# Patient Record
Sex: FEMALE | Race: WHITE | NOT HISPANIC OR LATINO | Employment: PART TIME | URBAN - NONMETROPOLITAN AREA
[De-identification: names, ages, dates, MRNs, and addresses within clinical notes are randomized per-mention and may not be internally consistent; named-entity substitution may affect disease eponyms.]

---

## 2023-05-20 ENCOUNTER — APPOINTMENT (EMERGENCY)
Dept: RADIOLOGY | Facility: HOSPITAL | Age: 48
End: 2023-05-20

## 2023-05-20 ENCOUNTER — APPOINTMENT (OUTPATIENT)
Dept: URGENT CARE | Facility: MEDICAL CENTER | Age: 48
End: 2023-05-20

## 2023-05-20 ENCOUNTER — HOSPITAL ENCOUNTER (EMERGENCY)
Facility: HOSPITAL | Age: 48
Discharge: HOME | End: 2023-05-20
Attending: EMERGENCY MEDICINE

## 2023-05-20 VITALS
DIASTOLIC BLOOD PRESSURE: 96 MMHG | OXYGEN SATURATION: 100 % | SYSTOLIC BLOOD PRESSURE: 165 MMHG | TEMPERATURE: 97.5 F | RESPIRATION RATE: 16 BRPM | HEART RATE: 84 BPM

## 2023-05-20 DIAGNOSIS — S61.011A LACERATION OF RIGHT THUMB: Primary | ICD-10-CM

## 2023-05-21 ENCOUNTER — APPOINTMENT (OUTPATIENT)
Dept: URGENT CARE | Facility: MEDICAL CENTER | Age: 48
End: 2023-05-21

## 2023-05-26 ENCOUNTER — OCCMED (OUTPATIENT)
Dept: URGENT CARE | Facility: MEDICAL CENTER | Age: 48
End: 2023-05-26
Payer: OTHER MISCELLANEOUS
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
## 2023-05-26 PROCEDURE — 99212 OFFICE O/P EST SF 10 MIN: CPT

## 2023-05-26 NOTE — PROGRESS NOTES
[address]        NAME: Keven Christianson is a 46 y o  female  : 1975    MRN: [id number]  DATE: May 26, 2023  TIME: 6:10 PM    Assessment and Plan   No primary diagnosis found  No diagnosis found  Patient Instructions       Follow up with PCP in 3-5 days  Proceed to  ER if symptoms worsen  Chief Complaint   No chief complaint on file  History of Present Illness       HPI    Review of Systems   Review of Systems      Current Medications       Current Outpatient Medications:   •  calcium-vitamin D (OSCAL 500 + D) 500 mg-200 units per tablet, Take by mouth, Disp: , Rfl:   •  levothyroxine 175 mcg tablet, Take 1 tablet (175 mcg total) by mouth daily in the early morning, Disp: 90 tablet, Rfl: 0  •  venlafaxine (EFFEXOR) 37 5 mg tablet, TAKE ONE TABLET BY MOUTH TWICE A DAY, Disp: 60 tablet, Rfl: 0    Current Allergies     Allergies as of 2023   • (No Known Allergies)            The following portions of the patient's history were reviewed and updated as appropriate: allergies, current medications, past family history, past medical history, past social history, past surgical history and problem list      Past Medical History:   Diagnosis Date   • Cancer ([facility] [address] )     thyroid   • Depression     Resolved 2017    • Disease of thyroid gland    • Hypothyroid in pregnancy, antepartum     Resolved 2017    • Menses, irregular     Resolved 3/26/2015    • Premenstrual syndrome     Resolved 3/26/2015    • Previous  section     Resolved 2017        Past Surgical History:   Procedure Laterality Date   •  SECTION     • THYROID SURGERY     • TUBAL LIGATION         Family History   Problem Relation Age of Onset   • Hypertension Mother    • Diabetes type II Father    • Heart attack Family         Silent myocardial infarction          Medications have been verified  Objective   There were no vitals taken for this visit  No LMP recorded         Physical Exam Physical Exam

## 2023-06-02 ENCOUNTER — OFFICE VISIT (OUTPATIENT)
Dept: WOUND CARE | Facility: CLINIC | Age: 48
End: 2023-06-02

## 2023-06-02 VITALS
SYSTOLIC BLOOD PRESSURE: 145 MMHG | HEART RATE: 85 BPM | DIASTOLIC BLOOD PRESSURE: 84 MMHG | RESPIRATION RATE: 20 BRPM | TEMPERATURE: 99.1 F

## 2023-06-02 DIAGNOSIS — L92.9 HYPERGRANULATION: ICD-10-CM

## 2023-06-02 DIAGNOSIS — S61.001A OPEN WOUND OF RIGHT THUMB, INITIAL ENCOUNTER: Primary | ICD-10-CM

## 2023-06-02 RX ORDER — LIDOCAINE 40 MG/G
CREAM TOPICAL ONCE
Status: COMPLETED | OUTPATIENT
Start: 2023-06-02 | End: 2023-06-02

## 2023-06-02 RX ADMIN — LIDOCAINE: 40 CREAM TOPICAL at 14:35

## 2023-06-02 NOTE — PROGRESS NOTES
Patient ID: Keven Christianson is a 46 y o  female Date of Birth 1975       Chief Complaint   Patient presents with   • New Patient Visit       Allergies:  Patient has no known allergies  Diagnosis:   Diagnosis ICD-10-CM Associated Orders   1  Open wound of right thumb, initial encounter  S61 001A lidocaine (LMX) 4 % cream     Wound cleansing and dressings     mupirocin (BACTROBAN) 2 % ointment     Chemical Caut Of A Wound      2  Hypergranulation  L92 9 Chemical Caut Of A Wound           Assessment  & Plan:    o Initial evaluation of traumatic wound of the R thumb  Wound with fibrin and hypergranulation tissue  Drainage is minimal  There is mild erythema inferior to nail plate that is not adjacent to the wound and likely represents irritation following bandage removal    o Selective debridement performed  o Chemical cauterization of hypergranulation tissue performed    o Mupirocin to the wound bed  Change at least once daily but multiple times prn if dressing becomes dislodged or wet  o Do not soak wound in any body of water  Avoid activities such as dishwashing that require hands to be submerged in water    o Obtain 3-4 servings of protein daily for wound healing    o Monitor for increased surrounding redness, pain, or swelling of thumb  Call office should this occur    o Instructed to monitor for any changes including redness or swelling surrounding the wound, increased drainage or pain as well as fevers or chills  o Attempt smoking reduction/cessation to assist with wound healing    o F/u in one week  Instructed to call if any questions or concerns arise in meantime  Subjective:   06/02/23: Pt presents for f/u of traumatic wound of her R thumb  This occurred on 05/20/23 at work while using a  that cut her finger  Was referred to the ED and surgifoam was placed over the wound  XR was taken and did not demonstrate an acute osseous abnormality   She has been following up with urgent care prior to appointment and has been using Xeroform with daily dressing changes to the site of the wound  Denies numbness to area  Is limiting her ROM/use of thumb  Obtains protein in her diet  Does smoke a few cigarettes each day  No hx of DM  Denies fevers, chills           The following portions of the patient's history were reviewed and updated as appropriate:   Patient Active Problem List   Diagnosis   • Thyroid disorder   • Anxiety   • Malignant neoplasm of thyroid gland (HCC)   • Postoperative hypothyroidism   • H/O  section     Past Medical History:   Diagnosis Date   • Cancer ([facility] [address] )     thyroid   • Depression     Resolved 2017    • Disease of thyroid gland    • Hypothyroid in pregnancy, antepartum     Resolved 2017    • Menses, irregular     Resolved 3/26/2015    • Premenstrual syndrome     Resolved 3/26/2015    • Previous  section     Resolved 2017      Past Surgical History:   Procedure Laterality Date   •  SECTION     • THYROID SURGERY     • TUBAL LIGATION       Family History   Problem Relation Age of Onset   • Hypertension Mother    • Diabetes type II Father    • Heart attack Family         Silent myocardial infarction      Social History     Socioeconomic History   • Marital status: /Civil Union     Spouse name: Not on file   • Number of children: Not on file   • Years of education: Not on file   • Highest education level: Not on file   Occupational History   • Not on file   Tobacco Use   • Smoking status: Some Days     Types: Cigarettes   • Smokeless tobacco: Never   Substance and Sexual Activity   • Alcohol use: No     Comment: social   • Drug use: No   • Sexual activity: Yes     Partners: Male     Birth control/protection: Other   Other Topics Concern   • Not on file   Social History Narrative    Being a social drinker, resolved 3/26/2015 - As per [employer]    Never a smoker - As per [employer]    Never used drugs - As per [employer]    No alcohol use - As per [employer]    No living will      Social Determinants of Health     Financial Resource Strain: Not on file   Food Insecurity: Not on file   Transportation Needs: Not on file   Physical Activity: Not on file   Stress: Not on file   Social Connections: Not on file   Intimate Partner Violence: Not on file   Housing Stability: Not on file       Current Outpatient Medications:   •  mupirocin (BACTROBAN) 2 % ointment, Apply topically daily for 14 days To wound of R thumb , Disp: 22 g, Rfl: 0  •  calcium-vitamin D (OSCAL 500 + D) 500 mg-200 units per tablet, Take by mouth, Disp: , Rfl:   •  levothyroxine 175 mcg tablet, Take 1 tablet (175 mcg total) by mouth daily in the early morning, Disp: 90 tablet, Rfl: 0  •  venlafaxine (EFFEXOR) 37 5 mg tablet, TAKE ONE TABLET BY MOUTH TWICE A DAY, Disp: 60 tablet, Rfl: 0  No current facility-administered medications for this visit  Review of Systems   Constitutional: Negative for chills and fever  Skin: Positive for wound (R thumb)  Objective:  /84   Pulse 85   Temp 99 1 °F (37 3 °C)   Resp 20   Pain Score: 0-No pain     Physical Exam  Vitals reviewed  Constitutional:       General: She is not in acute distress  Appearance: She is normal weight  Cardiovascular:      Pulses:           Radial pulses are 2+ on the right side  Pulmonary:      Effort: Pulmonary effort is normal  No respiratory distress  Musculoskeletal:      Right hand: Decreased range of motion  Normal sensation  Hands:       Comments: R thumb wound with fibrin and hypergranulation tissue  Drainage is minimal  There is mild erythema inferior to nail plate that is not circumferential around the wound  Skin:     Findings: Wound (thumb right) present  Neurological:      Mental Status: She is alert             Wound 06/02/23 Traumatic Finger (Comment which one) Right (Active)   Wound Image   06/02/23 1432   Wound Description Hypergranulation;Pink 06/02/23 1432 "  Dominique-wound Assessment Clean;Dry;Black; Swelling 06/02/23 1432   Wound Length (cm) 1 1 cm 06/02/23 1432   Wound Width (cm) 0 7 cm 06/02/23 1432   Wound Depth (cm) 0 1 cm 06/02/23 1432   Wound Surface Area (cm^2) 0 77 cm^2 06/02/23 1432   Wound Volume (cm^3) 0 077 cm^3 06/02/23 1432   Calculated Wound Volume (cm^3) 0 08 cm^3 06/02/23 1432   Drainage Amount Small 06/02/23 1432   Drainage Description Bloody 06/02/23 1432   Non-staged Wound Description Full thickness 06/02/23 1432   Patient Tolerance Tolerated well 06/02/23 1432                                Chemical Caut Of A Wound     Date/Time 6/2/2023 2:30 PM     Performed by  Ariel Olmos PA-C   Authorized by Ariel Olmos PA-C      Associated wounds:   Wound 06/02/23 Traumatic Finger (Comment which one) Right   Universal Protocol   Consent: Verbal consent obtained  Consent given by: patient  Time out: Immediately prior to procedure a \"time out\" was called to verify the correct patient, procedure, equipment, support staff and site/side marked as required  Patient understanding: patient states understanding of the procedure being performed  Patient identity confirmed: verbally with patient        Local anesthesia used: yes     Anesthesia   Local anesthesia used: yes  Local Anesthetic: topical anesthetic     Sedation   Patient sedated: no        Specimen: no    Culture: no   Procedure Details   Procedure Notes: One silver nitrate stick used to treat hypergranulation tissue present  Neutralized with saline  Wound Instructions:  Orders Placed This Encounter   Procedures   • Wound cleansing and dressings     Remove old dressing, discard into plastic bag and place in trash  Wash hands and thumb wound with soap and water (Dove for sensitive skin)  Do not use tissue or cotton balls  Do not scrub the wound  Pat dry using gauze  Shower yes    Apply Mupirocin oint  to the right thumb wound  Cover with gauze, secure tape   Change dressing " every day and as needed for excessive drainage, leakage or displacement  This was done today  Follow up in one week       Standing Status:   Future     Standing Expiration Date:   6/2/2024   • Chemical Caut Of A Wound     This order was created via procedure documentation       Jasper Calero PA-C

## 2023-06-02 NOTE — PATIENT INSTRUCTIONS
Orders Placed This Encounter   Procedures    Wound cleansing and dressings     Remove old dressing, discard into plastic bag and place in trash  Wash hands and thumb wound with soap and water (Dove for sensitive skin)  Do not use tissue or cotton balls  Do not scrub the wound  Pat dry using gauze  Shower yes    Apply Mupirocin oint  to the right thumb wound  Cover with gauze, secure tape  Change dressing every day and as needed for excessive drainage, leakage or displacement  This was done today  Follow up in one week  Standing Status:   Future     Standing Expiration Date:   6/2/2024      Wound Healing and Your Diet   WHAT YOU NEED TO KNOW:   Your body uses nutrients from healthy foods to heal wounds caused by injury, surgery, or pressure injuries  There is no special diet that will heal your wound, but a healthy meal plan can help your wound heal faster  Nutrients that are important for healing are protein, zinc, and vitamin C  Liquids are also important for wound healing  DISCHARGE INSTRUCTIONS:   Follow a healthy meal plan:   Eat a variety of foods from each food group every day  Eat regular meals and snacks to help you get enough calories and nutrients  If you have trouble eating 3 meals each day, eat 5 to 6 small meals throughout the day instead  Include good sources of protein, zinc, and vitamin C each day  Drink liquids as directed  Drink plenty of liquids during and between meals, unless your healthcare provider has told you to limit liquids  Ask your healthcare provider or dietitian how much liquid to drink each day and which liquids are best for you  Limit unhealthy foods,  such as those that are high in fat, sugar, and salt  Examples include doughnuts, cookies, fried foods, candy, and regular soda  These kinds of foods are low in nutrients that are important for healing      Good sources of protein:  Your dietitian will tell you how much protein and how many calories you need each day  The average amount of protein in foods is listed below in grams (g)  To find the exact amount of protein in a food, read the food labels on packaged items  Dairy:      1 cup of any type of milk (8 g)    ½ cup of evaporated canned milk (9 g)    ¼ cup of nonfat dry milk (11 g)    1 ounce of semi-hard or solid cheese (7 g)    ¼ cup of parmesan cheese (8 g)    ½ cup of cottage cheese (14 g)    ½ cup of pudding (4 g)    1 cup of plain or fruit yogurt (8 g)    Meats and meat substitutes:      3 ounces of cooked freshwater fish (21 g)    3 ounces of cooked shellfish (19 g)    ½ cup of canned tuna (14 g)    3 ounces of cooked chicken, turkey, or other poultry (24 g)    3 ounces of cooked beef, pork, lamb, or other red meat (21 g)    1 large egg (6 g)    ¼ cup of fat-free egg substitute (5 g)    ½ cup of tofu or tempeh (10 g)    1 cup of cooked dried beans, such as peter, kidney, or navy (15 g)    Nuts and seeds:      2 tablespoons of almonds, cashews, sunflower seeds, or walnuts (5 g)    2 tablespoons of peanuts (7 g)    2 tablespoons of peanut butter (8 g)       How to add extra protein:   Add powdered milk to milk, cereals, scrambled eggs, soups, and casseroles  Add cheese to sauces, soups, or vegetables  Add eggs to tuna, salads, sauces, or casseroles  Add nutrition supplements and breakfast drink mixes to milk or shakes  Add nuts to foods or eat them as snacks  Add meat (beef, chicken, or pork) to soups, casseroles, pasta dishes, or vegetables  Add beans, peas, and other legumes to salads  Eat cottage cheese or yogurt with fruit  Good sources of vitamin C:  Vitamin C is found in fruits and vegetables  Fruits such as oranges, strawberries, grapefruit, cantaloupe, and tangerines are good sources of vitamin C  Red and green bell peppers, broccoli, potatoes, tomatoes, and cabbage are also high in vitamin C        Good sources of zinc:  Good sources of zinc are beef, liver, and crab   Smaller amounts of zinc are found in sunflower seeds, almonds, peanut butter, eggs, and milk  Other foods that contain zinc include wheat germ, black-eyed peas, and whole-grain products  How to add extra calories to foods: Your dietitian may recommend that you add extra calories to your meals if you are not eating enough  You can increase calories by adding butter, margarine, sugar, or jam to foods  Work with your dietitian if you have other medical conditions and need to follow a special diet  What else you should know about nutrients and wound healing: Your healthcare provider or dietitian may recommend vitamin and nutrition supplements if your body is low in certain nutrients  If your dietitian recommends a liquid nutrition supplement, take it between meals  Ask your healthcare provider which supplements are right for you  © Copyright Travon Xavier 2022 Information is for End User's use only and may not be sold, redistributed or otherwise used for commercial purposes  The above information is an  only  It is not intended as medical advice for individual conditions or treatments  Talk to your doctor, nurse or pharmacist before following any medical regimen to see if it is safe and effective for you  Cigarette Smoking and Your Health   WHAT YOU NEED TO KNOW:   What are the risks to my health if I smoke tobacco?  Nicotine and other chemicals found in tobacco and e-cigarettes can damage every cell in your body  Even if you are a light smoker, you have an increased risk for cancer, heart disease, and lung disease  If you are pregnant or have diabetes, smoking increases your risk for complications  Nicotine can affect an adolescent's developing brain  This can lead to trouble thinking, learning, or paying attention  What are the benefits to my health if I stop smoking? You decrease respiratory symptoms such as coughing, wheezing, and shortness of breath      You reduce your risk for cancers of the lung, mouth, throat, kidney, bladder, pancreas, stomach, and cervix  If you already have cancer, you increase the benefits of chemotherapy  You also reduce your risk for cancer returning or a second cancer from developing  You reduce your risk for heart disease, blood clots, heart attack, and stroke  You reduce your risk for lung infections, and diseases such as pneumonia, asthma, chronic bronchitis, and emphysema  Your circulation improves  More oxygen can be delivered to your body  If you have diabetes, you lower your risk for complications, such as kidney, artery, and eye diseases  You also lower your risk for nerve damage  Nerve damage can lead to amputations, poor vision, and blindness  You improve your body's ability to heal and to fight infections  An adolescent can help his or her brain and body develop in a healthy way  Talk to your adolescent about all the health risks of nicotine  If you can, start talking about nicotine when your child is younger than 12 years  This may make it easier for him or her not to start using nicotine as a teenager or adult  Explain to him or her that it is best never to start  It can be hard to try to quit later  What are the health benefits to others if I stop smoking? Tobacco is harmful to nonsmokers who breathe in your secondhand smoke  The following are ways the health of others around you may improve when you stop smoking: You lower the risks for lung cancer and heart disease in nonsmoking adults  If you are pregnant, you lower the risk for miscarriage, early delivery, low birth weight, and stillbirth  You also lower your baby's risk for SIDS, obesity, developmental delay, and neurobehavioral problems, such as ADHD  If you have children, you lower their risk for ear infections, colds, pneumonia, bronchitis, and asthma  Where can I find support and more information? American [employer] Association  [address] [facility]   [place] [address] [address]  Phone: 8- [phone] [facility]  Phone: [phone]  Web Address: Jimmy olson    Smokefree  gov  Phone: 2- 360 - 062-4232  Web Address: [employer] smokefree  gov  CARE AGREEMENT:   You have the right to help plan your care  Learn about your health condition and how it may be treated  Discuss treatment options with your healthcare providers to decide what care you want to receive  You always have the right to refuse treatment  The above information is an  only  It is not intended as medical advice for individual conditions or treatments  Talk to your doctor, nurse or pharmacist before following any medical regimen to see if it is safe and effective for you  © Copyright Tyshawn Smith 2022 Information is for End User's use only and may not be sold, redistributed or otherwise used for commercial purposes  Wound Infection   WHAT YOU NEED TO KNOW:   A wound infection occurs when bacteria enters a break in the skin  The infection may involve just the skin, or affect deeper tissues or organs close to the wound  DISCHARGE INSTRUCTIONS:   Seek care immediately if:   You feel short of breath  Your heart is beating faster than usual      You feel confused  Blood soaks through your bandages  Your wound comes apart or feels like it is ripping  You have severe pain  You see red streaks coming from the infected area  Contact your healthcare provider if:   You have a fever or chills  You have more pain, redness, or swelling near your wound  Your symptoms do not improve  The skin around your wound feels numb  You have questions or concerns about your condition or care  Medicines: You may need any of the following:  NSAIDs , such as ibuprofen, help decrease swelling, pain, and fever  This medicine is available with or without a doctor's order  NSAIDs can cause stomach bleeding or kidney problems in certain people   If you take blood thinner medicine, always ask your healthcare provider if NSAIDs are safe for you  Always read the medicine label and follow directions  Antibiotics  help treat a bacterial infection  Take your medicine as directed  Contact your healthcare provider if you think your medicine is not helping or if you have side effects  Tell your provider if you are allergic to any medicine  Keep a list of the medicines, vitamins, and herbs you take  Include the amounts, and when and why you take them  Bring the list or the pill bottles to follow-up visits  Carry your medicine list with you in case of an emergency  Care for your wound as directed:  Keep your wound clean and dry  You may need to cover your wound when you bathe so it does not get wet  Clean your wound as directed with soap and water or wound   Put on new, clean bandages as directed  Change your bandages when they get wet or dirty  Help your wound heal:   Eat a variety of healthy foods  Examples include fruits, vegetables, whole-grain breads, low-fat dairy products, beans, lean meats, and fish  Healthy foods may help you heal faster  You may also need to take vitamins and minerals  Ask if you need to be on a special diet  Manage other health conditions  Follow your provider's directions to manage health conditions that can cause slow wound healing  Examples include high blood pressure and diabetes  Do not smoke  Nicotine and other chemicals in cigarettes and cigars can cause slow wound healing  Ask your provider for information if you currently smoke and need help to quit  E-cigarettes or smokeless tobacco still contain nicotine  Talk to your provider before you use these products  Follow up with your healthcare provider in 1 to 2 days:  Write down your questions so you remember to ask them during your visits  © Copyright Vallie Flatten 2022 Information is for End User's use only and may not be sold, redistributed or otherwise used for commercial purposes  The above information is an  only   It is not intended as medical advice for individual conditions or treatments  Talk to your doctor, nurse or pharmacist before following any medical regimen to see if it is safe and effective for you

## 2023-06-02 NOTE — PROGRESS NOTES
"Debridement   Wound 06/02/23 Traumatic Finger (Comment which one) Right    Universal Protocol:  Consent: Verbal consent obtained  Consent given by: patient  Time out: Immediately prior to procedure a \"time out\" was called to verify the correct patient, procedure, equipment, support staff and site/side marked as required    Patient understanding: patient states understanding of the procedure being performed  Patient identity confirmed: verbally with patient      Performed by: PA  Debridement type: selective  Pain control: lidocaine 4%  Post-debridement measurements  Length (cm): 1 1  Width (cm): 0 7  Depth (cm): 0 1  Percent debrided: 100%  Surface Area (cm^2): 0 77  Area debrided (cm^2): 0 77  Volume (cm^3): 0 08  Devitalized tissue debrided: fibrin  Instrument(s) utilized: curette  Bleeding: small  Hemostasis obtained with: pressure  Procedural pain (0-10): 0  Post-procedural pain: 0   Response to treatment: procedure was tolerated well          "

## 2023-06-09 ENCOUNTER — OFFICE VISIT (OUTPATIENT)
Dept: WOUND CARE | Facility: CLINIC | Age: 48
End: 2023-06-09
Payer: OTHER MISCELLANEOUS

## 2023-06-09 VITALS
SYSTOLIC BLOOD PRESSURE: 138 MMHG | DIASTOLIC BLOOD PRESSURE: 80 MMHG | RESPIRATION RATE: 20 BRPM | HEART RATE: 74 BPM | TEMPERATURE: 99.4 F

## 2023-06-09 DIAGNOSIS — S61.001A OPEN WOUND OF RIGHT THUMB, INITIAL ENCOUNTER: Primary | ICD-10-CM

## 2023-06-09 PROCEDURE — NC001 PR NO CHARGE: Performed by: NURSE PRACTITIONER

## 2023-06-09 PROCEDURE — 97597 DBRDMT OPN WND 1ST 20 CM/<: CPT | Performed by: NURSE PRACTITIONER

## 2023-06-09 RX ORDER — LIDOCAINE 40 MG/G
CREAM TOPICAL ONCE
Status: COMPLETED | OUTPATIENT
Start: 2023-06-09 | End: 2023-06-09

## 2023-06-09 RX ADMIN — LIDOCAINE 1 APPLICATION.: 40 CREAM TOPICAL at 14:30

## 2023-06-09 NOTE — PATIENT INSTRUCTIONS
Orders Placed This Encounter   Procedures    Debridement     This order was created via procedure documentation    Wound cleansing and dressings     Remove old dressing, discard into plastic bag and place in trash  Wash hands and thumb wound with soap and water (Dove for sensitive skin)  Do not use tissue or cotton balls  Do not scrub the wound  Pat dry using gauze  Shower yes     Apply Mupirocin oint  to the right thumb wound  Cover with gauze, secure tape  Change dressing every day and as needed for excessive drainage, leakage or displacement  This was done today       Standing Status:   Future     Standing Expiration Date:   6/9/2024

## 2023-06-09 NOTE — PROGRESS NOTES
"Patient ID: Nikhil Kennedy is a 46 y o  female Date of Birth 1975     Chief Complaint  Chief Complaint   Patient presents with   • Follow Up Wound Care Visit       Allergies  Patient has no known allergies  Assessment:     Diagnoses and all orders for this visit:    Open wound of right thumb, initial encounter  -     lidocaine (LMX) 4 % cream  -     Wound cleansing and dressings; Future    Other orders  -     Debridement              Debridement   Wound 06/02/23 Traumatic Finger (Comment which one) Right    Universal Protocol:  Consent: Written consent obtained  Consent given by: patient  Time out: Immediately prior to procedure a \"time out\" was called to verify the correct patient, procedure, equipment, support staff and site/side marked as required  Timeout called at: 6/9/2023 2:42 PM   Patient identity confirmed: verbally with patient      Performed by: NP  Debridement type: selective  Pain control: lidocaine 4%  Pre-debridement measurements  Length (cm): 1  Width (cm): 0 3  Depth (cm): 0 1  Surface Area (cm^2): 0 3  Volume (cm^3): 0 03    Post-debridement measurements  Length (cm): 1  Width (cm): 0 3  Depth (cm): 0 1  Percent debrided: 100%  Surface Area (cm^2): 0 3  Area debrided (cm^2): 0 3  Volume (cm^3): 0 03  Devitalized tissue debrided: biofilm, fibrin and slough  Instrument(s) utilized: curette  Bleeding: small  Hemostasis obtained with: pressure  Procedural pain (0-10): 0  Post-procedural pain: 0   Response to treatment: procedure was tolerated well          Plan:  1  F/U visit  Wound debrided  Wound improving and measuring smaller  Continue current plan of care  Patient will follow-up in 1 week  Wound 06/02/23 Traumatic Finger (Comment which one) Right (Active)   Wound Image Images linked 06/09/23 1422   Wound Description Yellow;Pink;Pale 06/09/23 1429   Dominique-wound Assessment Dry; Intact 06/09/23 1429   Wound Length (cm) 1 cm 06/09/23 1429   Wound Width (cm) 0 3 cm 06/09/23 1429 " Wound Depth (cm) 0 1 cm 23   Wound Surface Area (cm^2) 0 3 cm^2 23 142   Wound Volume (cm^3) 0 03 cm^3 23 142   Calculated Wound Volume (cm^3) 0 03 cm^3 23 142   Change in Wound Size % 62 5 23 1429   Drainage Amount Small 23 1429   Drainage Description Serous 23 1429   Non-staged Wound Description Full thickness 23 142   Dressing Status Intact 23 1429       Wound 23 Traumatic Finger (Comment which one) Right (Active)   Date First Assessed/Time First Assessed: 23 1432   Present on Original Admission: Yes  Primary Wound Type: Traumatic  Location: Finger (Comment which one)  Wound Location Orientation: Right  Wound Description (Comments): Thumb       Subjective:        F/u visit for traumatic wound of right thumb  No new complaints  She denies any pain, fevers, or chills  The following portions of the patient's history were reviewed and updated as appropriate:   She  has a past medical history of Cancer ([facility] [address] ), Depression, Disease of thyroid gland, Hypothyroid in pregnancy, antepartum, Menses, irregular, Premenstrual syndrome, and Previous  section  She   Patient Active Problem List    Diagnosis Date Noted   • H/O  section 2022   • Thyroid disorder 2016   • Anxiety 2012   • Postoperative hypothyroidism 2011   • Malignant neoplasm of thyroid gland ([facility] [address] ) 2010     She  has a past surgical history that includes Thyroid surgery;  section; and Tubal ligation  Her family history includes Diabetes type II in her father; Heart attack in her family; Hypertension in her mother  She  reports that she has been smoking cigarettes  She has never used smokeless tobacco  She reports that she does not drink alcohol and does not use drugs    Current Outpatient Medications   Medication Sig Dispense Refill   • calcium-vitamin D (OSCAL 500 + D) 500 mg-200 units per tablet Take by mouth     • levothyroxine 175 mcg tablet Take 1 tablet (175 mcg total) by mouth daily in the early morning 90 tablet 0   • mupirocin (BACTROBAN) 2 % ointment Apply topically daily for 14 days To wound of R thumb  22 g 0   • venlafaxine (EFFEXOR) 37 5 mg tablet TAKE ONE TABLET BY MOUTH TWICE A DAY 60 tablet 0     No current facility-administered medications for this visit  She has No Known Allergies       Review of Systems   Constitutional: Negative  HENT: Negative for ear pain and hearing loss  Eyes: Negative for pain  Respiratory: Negative for chest tightness and shortness of breath  Cardiovascular: Negative for chest pain, palpitations and leg swelling  Gastrointestinal: Negative for diarrhea, nausea and vomiting  Genitourinary: Negative for dysuria  Musculoskeletal: Negative for gait problem  Skin: Positive for wound  Neurological: Negative for tremors and weakness  Psychiatric/Behavioral: Negative for behavioral problems, confusion and suicidal ideas  Objective:       Wound 06/02/23 Traumatic Finger (Comment which one) Right (Active)   Wound Image Images linked 06/09/23 1422   Wound Description Yellow;Pink;Pale 06/09/23 1429   Dominique-wound Assessment Dry; Intact 06/09/23 1429   Wound Length (cm) 1 cm 06/09/23 1429   Wound Width (cm) 0 3 cm 06/09/23 1429   Wound Depth (cm) 0 1 cm 06/09/23 1429   Wound Surface Area (cm^2) 0 3 cm^2 06/09/23 1429   Wound Volume (cm^3) 0 03 cm^3 06/09/23 1429   Calculated Wound Volume (cm^3) 0 03 cm^3 06/09/23 1429   Change in Wound Size % 62 5 06/09/23 1429   Drainage Amount Small 06/09/23 1429   Drainage Description Serous 06/09/23 1429   Non-staged Wound Description Full thickness 06/09/23 1429   Dressing Status Intact 06/09/23 1429       /80   Pulse 74   Temp 99 4 °F (37 4 °C)   Resp 20             Wound Instructions:  Orders Placed This Encounter   Procedures   • Debridement     This order was created via procedure documentation   • Wound cleansing and dressings     Remove old dressing, discard into plastic bag and place in trash  Wash hands and thumb wound with soap and water (Dove for sensitive skin)  Do not use tissue or cotton balls  Do not scrub the wound  Pat dry using gauze      Shower yes     Apply Mupirocin oint  to the right thumb wound  Cover with gauze, secure tape  Change dressing every day and as needed for excessive drainage, leakage or displacement  This was done today  Standing Status:   Future     Standing Expiration Date:   6/9/2024        Diagnosis ICD-10-CM Associated Orders   1   Open wound of right thumb, initial encounter  S61 001A lidocaine (LMX) 4 % cream     Wound cleansing and dressings

## 2023-06-16 ENCOUNTER — OFFICE VISIT (OUTPATIENT)
Dept: WOUND CARE | Facility: CLINIC | Age: 48
End: 2023-06-16
Payer: OTHER MISCELLANEOUS

## 2023-06-16 VITALS
DIASTOLIC BLOOD PRESSURE: 89 MMHG | SYSTOLIC BLOOD PRESSURE: 156 MMHG | RESPIRATION RATE: 20 BRPM | TEMPERATURE: 98.7 F | HEART RATE: 78 BPM

## 2023-06-16 DIAGNOSIS — S61.001A OPEN WOUND OF RIGHT THUMB, INITIAL ENCOUNTER: Primary | ICD-10-CM

## 2023-06-16 DIAGNOSIS — L85.9 HYPERKERATOSIS: ICD-10-CM

## 2023-06-16 PROCEDURE — 99212 OFFICE O/P EST SF 10 MIN: CPT | Performed by: STUDENT IN AN ORGANIZED HEALTH CARE EDUCATION/TRAINING PROGRAM

## 2023-06-16 RX ORDER — AMMONIUM LACTATE 12 G/100G
LOTION TOPICAL 2 TIMES DAILY PRN
Qty: 57 G | Refills: 0 | Status: SHIPPED | OUTPATIENT
Start: 2023-06-16 | End: 2023-07-16

## 2023-06-16 NOTE — PROGRESS NOTES
Patient ID: Kasia Armstrong is a 46 y o  female Date of Birth 1975       Chief Complaint   Patient presents with   • Follow Up Wound Care Visit       Allergies:  Patient has no known allergies  Diagnosis:   Diagnosis ICD-10-CM Associated Orders   1  Open wound of right thumb, initial encounter  S61 001A Wound cleansing and dressings      2  Hyperkeratosis  L85 9 ammonium lactate (LAC-HYDRIN) 12 % lotion           Assessment  & Plan:    • F/u R thumb wound  Is now healed  There is dry hyperkeratotic skin build-up present over the prior wound bed  No drainage from the site  No edema or erythema to indicate acute infection  There is deformity where tissue has been lost    o Lac hydrin to dry hyperkeratotic skin to help soften the area  If maceration occurs d/c use of the product    o If paresthesias continue would refer to hand specialist for further evaluation    o D/c from wound center  Call in future if any questions or concerns  Subjective:   06/02/23: Pt presents for f/u of traumatic wound of her R thumb  This occurred on 05/20/23 at work while using a  that cut her finger  Was referred to the ED and surgifoam was placed over the wound  XR was taken and did not demonstrate an acute osseous abnormality  She has been following up with urgent care prior to appointment and has been using Xeroform with daily dressing changes to the site of the wound  Denies numbness to area  Is limiting her ROM/use of thumb  Obtains protein in her diet  Does smoke a few cigarettes each day  No hx of DM  Denies fevers, chills  06/16/23: Pt presents for f/u of traumatic wound of her R thumb  The wound site has not continued to drain  There is hard skin over the site  She has continued placing the dressing over the area  Is noting a tingling sensation with pressure to the area but not pain           The following portions of the patient's history were reviewed and updated as appropriate:   Patient Active Problem List   Diagnosis   • Thyroid disorder   • Anxiety   • Malignant neoplasm of thyroid gland (HCC)   • Postoperative hypothyroidism   • H/O  section     Past Medical History:   Diagnosis Date   • Cancer ([facility] [address] )     thyroid   • Depression     Resolved 2017    • Disease of thyroid gland    • Hypothyroid in pregnancy, antepartum     Resolved 2017    • Menses, irregular     Resolved 3/26/2015    • Premenstrual syndrome     Resolved 3/26/2015    • Previous  section     Resolved 2017      Past Surgical History:   Procedure Laterality Date   •  SECTION     • THYROID SURGERY     • TUBAL LIGATION       Family History   Problem Relation Age of Onset   • Hypertension Mother    • Diabetes type II Father    • Heart attack Family         Silent myocardial infarction      Social History     Socioeconomic History   • Marital status: /Civil Union     Spouse name: Not on file   • Number of children: Not on file   • Years of education: Not on file   • Highest education level: Not on file   Occupational History   • Not on file   Tobacco Use   • Smoking status: Some Days     Types: Cigarettes   • Smokeless tobacco: Never   Substance and Sexual Activity   • Alcohol use: No     Comment: social   • Drug use: No   • Sexual activity: Yes     Partners: Male     Birth control/protection: Other   Other Topics Concern   • Not on file   Social History Narrative    Being a social drinker, resolved 3/26/2015 - As per [employer]    Never a smoker - As per [employer]    Never used drugs - As per [employer]    No alcohol use - As per [employer]    No living will      Social Determinants of Health     Financial Resource Strain: Not on file   Food Insecurity: Not on file   Transportation Needs: Not on file   Physical Activity: Not on file   Stress: Not on file   Social Connections: Not on file   Intimate Partner Violence: Not on file   Housing Stability: Not on file       Current Outpatient Medications: •  ammonium lactate (LAC-HYDRIN) 12 % lotion, Apply topically 2 (two) times a day as needed for dry skin Of R thumb, Disp: 57 g, Rfl: 0  •  calcium-vitamin D (OSCAL 500 + D) 500 mg-200 units per tablet, Take by mouth, Disp: , Rfl:   •  levothyroxine 175 mcg tablet, Take 1 tablet (175 mcg total) by mouth daily in the early morning, Disp: 90 tablet, Rfl: 0  •  mupirocin (BACTROBAN) 2 % ointment, Apply topically daily for 14 days To wound of R thumb , Disp: 22 g, Rfl: 0  •  venlafaxine (EFFEXOR) 37 5 mg tablet, TAKE ONE TABLET BY MOUTH TWICE A DAY, Disp: 60 tablet, Rfl: 0    Review of Systems   Constitutional: Negative for chills and fever  Skin: Positive for wound (R thumb-healed)  Objective:  /89   Pulse 78   Temp 98 7 °F (37 1 °C)   Resp 20   Pain Score: 0-No pain     Physical Exam  Vitals reviewed  Constitutional:       General: She is not in acute distress  Appearance: She is normal weight  Cardiovascular:      Pulses:           Radial pulses are 2+ on the right side  Pulmonary:      Effort: Pulmonary effort is normal  No respiratory distress  Musculoskeletal:      Right hand: Decreased range of motion  Normal sensation  Hands:       Comments:    R thumb wound is now healed  There is dry hyperkeratotic skin build-up present over the prior wound bed  No drainage from the site  No edema or erythema to indicate acute infection  There is deformity where tissue has been lost from initial trauma  Skin:     Findings: Wound (thumb right) present  Neurological:      Mental Status: She is alert  Wound 06/02/23 Traumatic Finger (Comment which one) Right (Active)   Wound Image   06/16/23 1500   Wound Description Epithelialization 06/16/23 1500   Dominique-wound Assessment Dry; Intact 06/16/23 1500   Wound Length (cm) 0 cm 06/16/23 1500   Wound Width (cm) 0 cm 06/16/23 1500   Wound Depth (cm) 0 cm 06/16/23 1500   Wound Surface Area (cm^2) 0 cm^2 06/16/23 1500   Wound Volume (cm^3) 0 cm^3 06/16/23 1500   Calculated Wound Volume (cm^3) 0 cm^3 06/16/23 1500   Change in Wound Size % 100 06/16/23 1500   Drainage Amount None 06/16/23 1500   Drainage Description Serous 06/09/23 1429   Non-staged Wound Description Full thickness 06/09/23 1429   Patient Tolerance Tolerated well 06/16/23 1500   Dressing Status Intact 06/09/23 1429                      Wound Instructions:  Orders Placed This Encounter   Procedures   • Wound cleansing and dressings     You are healed at this time  You do not need to return to the [place] cream to right thumb daily and as needed for dry skin      If you have any questions or concerns call [address] [facility],[address] at [phone]     Standing Status:   Future     Standing Expiration Date:   6/16/2024       Darcel Nissen, PA-C

## 2023-07-03 DIAGNOSIS — E03.9 HYPOTHYROIDISM, UNSPECIFIED TYPE: ICD-10-CM

## 2023-07-04 RX ORDER — LEVOTHYROXINE SODIUM 175 UG/1
175 TABLET ORAL
Qty: 90 TABLET | Refills: 0 | Status: SHIPPED | OUTPATIENT
Start: 2023-07-04

## 2023-09-29 DIAGNOSIS — E03.9 HYPOTHYROIDISM, UNSPECIFIED TYPE: ICD-10-CM

## 2023-10-02 RX ORDER — LEVOTHYROXINE SODIUM 175 UG/1
175 TABLET ORAL
Qty: 90 TABLET | Refills: 0 | OUTPATIENT
Start: 2023-10-02

## 2023-10-10 DIAGNOSIS — E03.9 HYPOTHYROIDISM, UNSPECIFIED TYPE: ICD-10-CM

## 2023-10-12 RX ORDER — LEVOTHYROXINE SODIUM 175 UG/1
175 TABLET ORAL
Qty: 90 TABLET | Refills: 0 | OUTPATIENT
Start: 2023-10-12

## 2023-11-06 ENCOUNTER — OFFICE VISIT (OUTPATIENT)
Dept: FAMILY MEDICINE CLINIC | Facility: CLINIC | Age: 48
End: 2023-11-06
Payer: COMMERCIAL

## 2023-11-06 VITALS
TEMPERATURE: 98.8 F | BODY MASS INDEX: 16.62 KG/M2 | OXYGEN SATURATION: 98 % | DIASTOLIC BLOOD PRESSURE: 80 MMHG | HEART RATE: 87 BPM | SYSTOLIC BLOOD PRESSURE: 130 MMHG | WEIGHT: 103 LBS

## 2023-11-06 DIAGNOSIS — Z12.11 COLON CANCER SCREENING: ICD-10-CM

## 2023-11-06 DIAGNOSIS — Z12.31 BREAST CANCER SCREENING BY MAMMOGRAM: ICD-10-CM

## 2023-11-06 DIAGNOSIS — Z23 ENCOUNTER FOR IMMUNIZATION: ICD-10-CM

## 2023-11-06 DIAGNOSIS — H10.33 ACUTE CONJUNCTIVITIS OF BOTH EYES, UNSPECIFIED ACUTE CONJUNCTIVITIS TYPE: ICD-10-CM

## 2023-11-06 DIAGNOSIS — Z13.89 SCREENING FOR MULTIPLE CONDITIONS: ICD-10-CM

## 2023-11-06 DIAGNOSIS — E03.9 HYPOTHYROIDISM, UNSPECIFIED TYPE: Primary | ICD-10-CM

## 2023-11-06 PROCEDURE — 99213 OFFICE O/P EST LOW 20 MIN: CPT | Performed by: PHYSICIAN ASSISTANT

## 2023-11-06 PROCEDURE — 90471 IMMUNIZATION ADMIN: CPT | Performed by: PHYSICIAN ASSISTANT

## 2023-11-06 PROCEDURE — 90686 IIV4 VACC NO PRSV 0.5 ML IM: CPT | Performed by: PHYSICIAN ASSISTANT

## 2023-11-06 RX ORDER — LEVOTHYROXINE SODIUM 175 UG/1
175 TABLET ORAL
Qty: 90 TABLET | Refills: 0 | Status: SHIPPED | OUTPATIENT
Start: 2023-11-06

## 2023-11-06 RX ORDER — TOBRAMYCIN 3 MG/ML
1 SOLUTION/ DROPS OPHTHALMIC
Qty: 1.8 ML | Refills: 0 | Status: SHIPPED | OUTPATIENT
Start: 2023-11-06 | End: 2023-11-13

## 2023-11-06 NOTE — PROGRESS NOTES
Name: Nahomy Sevilla      : 1975      MRN: [id number]  Encounter Provider: Satnam Hoyt PA-C  Encounter Date: 2023   Encounter department: [address]     1. Hypothyroidism, unspecified type  Assessment & Plan:  Refill given, to have TSH drawn in 1 month. Orders:  -     levothyroxine 175 mcg tablet; Take 1 tablet (175 mcg total) by mouth daily in the early morning  -     TSH, 3rd generation with Free T4 reflex; Future    2. Breast cancer screening by mammogram  -     Mammo screening bilateral w 3d & cad; Future; Expected date: 2023    3. Colon cancer screening  -     Ambulatory Referral to Gastroenterology; Future    4. Encounter for immunization  -     influenza vaccine, quadrivalent, 0.5 mL, preservative-free, for adult and pediatric patients 6 mos+ (AFLURIA, FLUARIX, FLULAVAL, FLUZONE)    5. Screening for multiple conditions  -     CBC; Future  -     Comprehensive metabolic panel; Future  -     Lipid Panel with Direct LDL reflex; Future    6. Acute conjunctivitis of both eyes, unspecified acute conjunctivitis type  Assessment & Plan:  Given Tobrex eye drops to use x 7 days, advise to not wear contacts x 1 week. Orders:  -     tobramycin (Tobrex) 0.3 % SOLN; Administer 1 drop to both eyes every 4 (four) hours while awake for 7 days        Depression Screening and Follow-up Plan: Patient was screened for depression during today's encounter. They screened negative with a PHQ-2 score of 0. Tobacco Cessation Counseling: Tobacco cessation counseling was provided. The patient is sincerely urged to quit consumption of tobacco. She is not ready to quit tobacco.         Rosaline Abebe is here today for follow up, has not been in office since 2019. Due for preventative screenings and labs. Pt out of levothyroxine x few days, advised to restart and check labs after retaking x 1 month.   She does complaining of sticky, irritated eyes bilaterally since yesterday. Review of Systems   Constitutional:  Negative for chills and fever. HENT:  Negative for ear pain and sore throat. Eyes:  Positive for discharge and redness. Negative for visual disturbance. Respiratory:  Negative for cough and shortness of breath. Cardiovascular:  Negative for chest pain and palpitations. Gastrointestinal:  Negative for abdominal pain and vomiting. Genitourinary:  Negative for dysuria and hematuria. Musculoskeletal:  Negative for arthralgias and back pain. Skin:  Negative for color change and rash. Neurological:  Negative for seizures and syncope. All other systems reviewed and are negative. Current Outpatient Medications on File Prior to Visit   Medication Sig   • [DISCONTINUED] levothyroxine 175 mcg tablet Take 1 tablet (175 mcg total) by mouth daily in the early morning   • ammonium lactate (LAC-HYDRIN) 12 % lotion Apply topically 2 (two) times a day as needed for dry skin Of R thumb   • calcium-vitamin D (OSCAL 500 + D) 500 mg-200 units per tablet Take by mouth (Patient not taking: Reported on 11/6/2023)   • mupirocin (BACTROBAN) 2 % ointment Apply topically daily for 14 days To wound of R thumb. • venlafaxine (EFFEXOR) 37.5 mg tablet TAKE ONE TABLET BY MOUTH TWICE A DAY (Patient not taking: Reported on 11/6/2023)       Objective     /80   Pulse 87   Temp 98.8 °F (37.1 °C)   Wt 46.7 kg (103 lb)   SpO2 98%   BMI 16.62 kg/m²     Physical Exam  Vitals reviewed. Constitutional:       General: She is not in acute distress. Appearance: She is well-developed. She is not diaphoretic. HENT:      Head: Normocephalic and atraumatic. Right Ear: Hearing, tympanic membrane, ear canal and external ear normal.      Left Ear: Hearing, tympanic membrane, ear canal and external ear normal.      Nose: Nose normal.      Mouth/Throat:      Mouth: Mucous membranes are moist.      Pharynx: Oropharynx is clear. Uvula midline. No oropharyngeal exudate. Eyes:      General: No scleral icterus. Right eye: No discharge. Left eye: No discharge. Conjunctiva/sclera:      Right eye: Right conjunctiva is injected. Left eye: Left conjunctiva is injected. Neck:      Thyroid: No thyromegaly. Vascular: No carotid bruit. Cardiovascular:      Rate and Rhythm: Normal rate and regular rhythm. Heart sounds: Normal heart sounds. No murmur heard. Pulmonary:      Effort: Pulmonary effort is normal. No respiratory distress. Breath sounds: Normal breath sounds. No wheezing. Abdominal:      General: Bowel sounds are normal. There is no distension. Palpations: Abdomen is soft. There is no mass. Tenderness: There is no abdominal tenderness. There is no guarding or rebound. Musculoskeletal:         General: No tenderness. Normal range of motion. Cervical back: Neck supple. Lymphadenopathy:      Cervical: No cervical adenopathy. Skin:     General: Skin is warm and dry. Findings: No erythema or rash. Neurological:      Mental Status: She is alert and oriented to person, place, and time. Psychiatric:         Behavior: Behavior normal.         Thought Content:  Thought content normal.         Judgment: Judgment normal.       Norman Meadows PA-C

## 2024-01-05 PROBLEM — H10.33 ACUTE CONJUNCTIVITIS OF BOTH EYES: Status: RESOLVED | Noted: 2023-11-06 | Resolved: 2024-01-05

## 2024-02-12 ENCOUNTER — LAB (OUTPATIENT)
Dept: LAB | Facility: MEDICAL CENTER | Age: 49
End: 2024-02-12
Payer: COMMERCIAL

## 2024-02-12 DIAGNOSIS — Z13.89 SCREENING FOR MULTIPLE CONDITIONS: ICD-10-CM

## 2024-02-12 DIAGNOSIS — E03.9 HYPOTHYROIDISM, UNSPECIFIED TYPE: ICD-10-CM

## 2024-02-12 LAB
ALBUMIN SERPL BCP-MCNC: 4.3 G/DL (ref 3.5–5)
ALP SERPL-CCNC: 70 U/L (ref 34–104)
ALT SERPL W P-5'-P-CCNC: 14 U/L (ref 7–52)
ANION GAP SERPL CALCULATED.3IONS-SCNC: 7 MMOL/L
AST SERPL W P-5'-P-CCNC: 23 U/L (ref 13–39)
BILIRUB SERPL-MCNC: 0.63 MG/DL (ref 0.2–1)
BUN SERPL-MCNC: 14 MG/DL (ref 5–25)
CALCIUM SERPL-MCNC: 9.3 MG/DL (ref 8.4–10.2)
CHLORIDE SERPL-SCNC: 101 MMOL/L (ref 96–108)
CHOLEST SERPL-MCNC: 151 MG/DL
CO2 SERPL-SCNC: 29 MMOL/L (ref 21–32)
CREAT SERPL-MCNC: 0.62 MG/DL (ref 0.6–1.3)
ERYTHROCYTE [DISTWIDTH] IN BLOOD BY AUTOMATED COUNT: 13.8 % (ref 11.6–15.1)
GFR SERPL CREATININE-BSD FRML MDRD: 105 ML/MIN/1.73SQ M
GLUCOSE P FAST SERPL-MCNC: 81 MG/DL (ref 65–99)
HCT VFR BLD AUTO: 41.3 % (ref 34.8–46.1)
HDLC SERPL-MCNC: 66 MG/DL
HGB BLD-MCNC: 12.7 G/DL (ref 11.5–15.4)
LDLC SERPL CALC-MCNC: 73 MG/DL (ref 0–100)
MCH RBC QN AUTO: 26.3 PG (ref 26.8–34.3)
MCHC RBC AUTO-ENTMCNC: 30.8 G/DL (ref 31.4–37.4)
MCV RBC AUTO: 86 FL (ref 82–98)
PLATELET # BLD AUTO: 369 THOUSANDS/UL (ref 149–390)
PMV BLD AUTO: 9.9 FL (ref 8.9–12.7)
POTASSIUM SERPL-SCNC: 4.2 MMOL/L (ref 3.5–5.3)
PROT SERPL-MCNC: 7.5 G/DL (ref 6.4–8.4)
RBC # BLD AUTO: 4.82 MILLION/UL (ref 3.81–5.12)
SODIUM SERPL-SCNC: 137 MMOL/L (ref 135–147)
T4 FREE SERPL-MCNC: 1.43 NG/DL (ref 0.61–1.12)
TRIGL SERPL-MCNC: 58 MG/DL
TSH SERPL DL<=0.05 MIU/L-ACNC: 0.01 UIU/ML (ref 0.45–4.5)
WBC # BLD AUTO: 6.58 THOUSAND/UL (ref 4.31–10.16)

## 2024-02-12 PROCEDURE — 80053 COMPREHEN METABOLIC PANEL: CPT

## 2024-02-12 PROCEDURE — 84439 ASSAY OF FREE THYROXINE: CPT

## 2024-02-12 PROCEDURE — 85027 COMPLETE CBC AUTOMATED: CPT

## 2024-02-12 PROCEDURE — 36415 COLL VENOUS BLD VENIPUNCTURE: CPT

## 2024-02-12 PROCEDURE — 80061 LIPID PANEL: CPT

## 2024-02-12 PROCEDURE — 84443 ASSAY THYROID STIM HORMONE: CPT

## 2024-02-13 DIAGNOSIS — E03.9 HYPOTHYROIDISM, UNSPECIFIED TYPE: ICD-10-CM

## 2024-02-13 RX ORDER — LEVOTHYROXINE SODIUM 0.15 MG/1
150 TABLET ORAL
Qty: 90 TABLET | Refills: 0 | Status: SHIPPED | OUTPATIENT
Start: 2024-02-13

## 2024-05-06 ENCOUNTER — VBI (OUTPATIENT)
Dept: ADMINISTRATIVE | Facility: OTHER | Age: 49
End: 2024-05-06

## 2024-06-05 ENCOUNTER — APPOINTMENT (OUTPATIENT)
Dept: LAB | Facility: HOSPITAL | Age: 49
End: 2024-06-05
Payer: COMMERCIAL

## 2024-06-05 DIAGNOSIS — E03.9 HYPOTHYROIDISM, UNSPECIFIED TYPE: ICD-10-CM

## 2024-06-05 LAB
T4 FREE SERPL-MCNC: 1.08 NG/DL (ref 0.61–1.12)
TSH SERPL DL<=0.05 MIU/L-ACNC: 0.1 UIU/ML (ref 0.45–4.5)

## 2024-06-05 PROCEDURE — 84443 ASSAY THYROID STIM HORMONE: CPT

## 2024-06-05 PROCEDURE — 36415 COLL VENOUS BLD VENIPUNCTURE: CPT

## 2024-06-05 PROCEDURE — 84439 ASSAY OF FREE THYROXINE: CPT

## 2024-06-05 RX ORDER — LEVOTHYROXINE SODIUM 137 UG/1
137 TABLET ORAL
Qty: 30 TABLET | Refills: 1 | Status: SHIPPED | OUTPATIENT
Start: 2024-06-05

## 2024-07-10 DIAGNOSIS — E03.9 HYPOTHYROIDISM, UNSPECIFIED TYPE: ICD-10-CM

## 2024-07-10 RX ORDER — LEVOTHYROXINE SODIUM 137 UG/1
TABLET ORAL
Qty: 90 TABLET | Refills: 1 | Status: SHIPPED | OUTPATIENT
Start: 2024-07-10

## 2024-08-09 ENCOUNTER — VBI (OUTPATIENT)
Dept: ADMINISTRATIVE | Facility: OTHER | Age: 49
End: 2024-08-09

## 2024-08-09 NOTE — TELEPHONE ENCOUNTER
08/09/24 6:40 AM     Chart reviewed for CRC: Colonoscopy ; nothing is submitted to the patient's insurance at this time.     Genny Flowers   [facility]
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
## 2025-04-08 ENCOUNTER — VBI (OUTPATIENT)
Dept: ADMINISTRATIVE | Facility: OTHER | Age: 50
End: 2025-04-08

## 2025-04-08 NOTE — TELEPHONE ENCOUNTER
04/08/25 10:20 AM     Chart reviewed for Pap Smear (HPV) aka Cervical Cancer Screening ; nothing is submitted to the patient's insurance at this time.     Alicia Reddy MA   [facility]

## 2025-05-06 ENCOUNTER — TELEPHONE (OUTPATIENT)
Dept: FAMILY MEDICINE CLINIC | Facility: CLINIC | Age: 50
End: 2025-05-06

## 2025-05-06 NOTE — TELEPHONE ENCOUNTER
Attempted to contact pt to schedule overdue physical. Unable to leave voicemail to give the office a call back to schedule or let office know if established elsewhere.

## 2025-05-22 ENCOUNTER — VBI (OUTPATIENT)
Dept: ADMINISTRATIVE | Facility: OTHER | Age: 50
End: 2025-05-22

## 2025-05-22 NOTE — TELEPHONE ENCOUNTER
05/22/25 11:37 AM     Chart reviewed for CRC: Colonoscopy ; nothing is submitted to the patient's insurance at this time.     Genny Flowers   [facility]

## 2025-06-14 DIAGNOSIS — E03.9 HYPOTHYROIDISM, UNSPECIFIED TYPE: ICD-10-CM

## 2025-06-16 RX ORDER — LEVOTHYROXINE SODIUM 137 UG/1
TABLET ORAL
Qty: 90 TABLET | Refills: 1 | OUTPATIENT
Start: 2025-06-16

## 2025-06-20 DIAGNOSIS — E03.9 HYPOTHYROIDISM, UNSPECIFIED TYPE: ICD-10-CM

## 2025-06-20 DIAGNOSIS — Z13.89 SCREENING FOR MULTIPLE CONDITIONS: Primary | ICD-10-CM

## 2025-06-20 RX ORDER — LEVOTHYROXINE SODIUM 137 UG/1
TABLET ORAL
Qty: 90 TABLET | Refills: 0 | OUTPATIENT
Start: 2025-06-20